# Patient Record
Sex: FEMALE | Race: BLACK OR AFRICAN AMERICAN | NOT HISPANIC OR LATINO
[De-identification: names, ages, dates, MRNs, and addresses within clinical notes are randomized per-mention and may not be internally consistent; named-entity substitution may affect disease eponyms.]

---

## 2017-02-07 ENCOUNTER — CLINICAL ADVICE (OUTPATIENT)
Age: 27
End: 2017-02-07

## 2017-04-10 ENCOUNTER — APPOINTMENT (OUTPATIENT)
Dept: OBGYN | Facility: CLINIC | Age: 27
End: 2017-04-10

## 2018-04-12 ENCOUNTER — TRANSCRIPTION ENCOUNTER (OUTPATIENT)
Age: 28
End: 2018-04-12

## 2018-04-12 ENCOUNTER — APPOINTMENT (OUTPATIENT)
Dept: OBGYN | Facility: CLINIC | Age: 28
End: 2018-04-12
Payer: COMMERCIAL

## 2018-04-12 ENCOUNTER — LABORATORY RESULT (OUTPATIENT)
Age: 28
End: 2018-04-12

## 2018-04-12 VITALS
HEIGHT: 65 IN | BODY MASS INDEX: 25.08 KG/M2 | DIASTOLIC BLOOD PRESSURE: 78 MMHG | WEIGHT: 150.56 LBS | SYSTOLIC BLOOD PRESSURE: 112 MMHG | HEART RATE: 60 BPM

## 2018-04-12 DIAGNOSIS — N92.6 IRREGULAR MENSTRUATION, UNSPECIFIED: ICD-10-CM

## 2018-04-12 DIAGNOSIS — N89.8 OTHER SPECIFIED NONINFLAMMATORY DISORDERS OF VAGINA: ICD-10-CM

## 2018-04-12 PROCEDURE — 99213 OFFICE O/P EST LOW 20 MIN: CPT | Mod: 25

## 2018-04-12 PROCEDURE — 99395 PREV VISIT EST AGE 18-39: CPT

## 2018-04-16 LAB
CANDIDA VAG CYTO: NOT DETECTED
G VAGINALIS+PREV SP MTYP VAG QL MICRO: DETECTED
HBV SURFACE AG SER QL: NONREACTIVE
HCG SERPL-MCNC: <1 MIU/ML
HCV AB SER QL: NONREACTIVE
HCV S/CO RATIO: 0.1 S/CO
HIV1+2 AB SPEC QL IA.RAPID: NONREACTIVE
T PALLIDUM AB SER QL IA: NEGATIVE
T VAGINALIS VAG QL WET PREP: NOT DETECTED

## 2018-05-07 ENCOUNTER — OTHER (OUTPATIENT)
Age: 28
End: 2018-05-07

## 2018-10-25 ENCOUNTER — RX RENEWAL (OUTPATIENT)
Age: 28
End: 2018-10-25

## 2019-06-10 ENCOUNTER — TRANSCRIPTION ENCOUNTER (OUTPATIENT)
Age: 29
End: 2019-06-10

## 2019-06-10 ENCOUNTER — MEDICATION RENEWAL (OUTPATIENT)
Age: 29
End: 2019-06-10

## 2019-06-12 ENCOUNTER — MEDICATION RENEWAL (OUTPATIENT)
Age: 29
End: 2019-06-12

## 2019-07-30 ENCOUNTER — APPOINTMENT (OUTPATIENT)
Dept: OBGYN | Facility: CLINIC | Age: 29
End: 2019-07-30
Payer: COMMERCIAL

## 2019-07-30 VITALS
DIASTOLIC BLOOD PRESSURE: 71 MMHG | HEIGHT: 65 IN | BODY MASS INDEX: 26.66 KG/M2 | HEART RATE: 57 BPM | WEIGHT: 160 LBS | SYSTOLIC BLOOD PRESSURE: 108 MMHG

## 2019-07-30 DIAGNOSIS — R63.5 ABNORMAL WEIGHT GAIN: ICD-10-CM

## 2019-07-30 DIAGNOSIS — Z01.419 ENCOUNTER FOR GYNECOLOGICAL EXAMINATION (GENERAL) (ROUTINE) W/OUT ABNORMAL FINDINGS: ICD-10-CM

## 2019-07-30 DIAGNOSIS — Z30.41 ENCOUNTER FOR SURVEILLANCE OF CONTRACEPTIVE PILLS: ICD-10-CM

## 2019-07-30 PROCEDURE — 99395 PREV VISIT EST AGE 18-39: CPT

## 2019-07-30 PROCEDURE — 99213 OFFICE O/P EST LOW 20 MIN: CPT | Mod: 25

## 2019-07-30 NOTE — PHYSICAL EXAM
[Awake] : awake [Alert] : alert [Soft] : soft [Oriented x3] : oriented to person, place, and time [Normal] : cervix [Discharge] : a  ~M vaginal discharge was present [Uterine Adnexae] : were not tender and not enlarged [No Bleeding] : there was no active vaginal bleeding [Acute Distress] : no acute distress [Mass] : no breast mass [Nipple Discharge] : no nipple discharge [Axillary LAD] : no axillary lymphadenopathy [Tender] : non tender [FreeTextEntry4] : white dc

## 2019-07-30 NOTE — COUNSELING
[Nutrition] : nutrition [Breast Self Exam] : breast self exam [Vitamins/Supplements] : vitamins/supplements [Exercise] : exercise [STD (testing, results, tx)] : STD (testing, results, tx) [Contraception] : contraception [Other ___] : [unfilled] [Safe Sexual Practices] : safe sexual practices [Weight Management] : weight management

## 2019-08-01 ENCOUNTER — OTHER (OUTPATIENT)
Age: 29
End: 2019-08-01

## 2019-08-01 ENCOUNTER — MEDICATION RENEWAL (OUTPATIENT)
Age: 29
End: 2019-08-01

## 2019-08-01 LAB
C TRACH RRNA SPEC QL NAA+PROBE: DETECTED
CANDIDA VAG CYTO: NOT DETECTED
G VAGINALIS+PREV SP MTYP VAG QL MICRO: DETECTED
HBV SURFACE AG SER QL: NONREACTIVE
HCV AB SER QL: NONREACTIVE
HCV S/CO RATIO: 0.07 S/CO
HIV1+2 AB SPEC QL IA.RAPID: NONREACTIVE
N GONORRHOEA RRNA SPEC QL NAA+PROBE: NOT DETECTED
SOURCE AMPLIFICATION: NORMAL
T PALLIDUM AB SER QL IA: NEGATIVE
T VAGINALIS VAG QL WET PREP: NOT DETECTED

## 2019-08-04 LAB — CYTOLOGY CVX/VAG DOC THIN PREP: ABNORMAL

## 2019-08-05 ENCOUNTER — OTHER (OUTPATIENT)
Age: 29
End: 2019-08-05

## 2019-10-09 ENCOUNTER — APPOINTMENT (OUTPATIENT)
Dept: OBGYN | Facility: CLINIC | Age: 29
End: 2019-10-09
Payer: COMMERCIAL

## 2019-10-09 VITALS
HEART RATE: 52 BPM | HEIGHT: 65 IN | SYSTOLIC BLOOD PRESSURE: 117 MMHG | WEIGHT: 160 LBS | DIASTOLIC BLOOD PRESSURE: 79 MMHG | BODY MASS INDEX: 26.66 KG/M2

## 2019-10-09 DIAGNOSIS — A74.9 CHLAMYDIAL INFECTION, UNSPECIFIED: ICD-10-CM

## 2019-10-09 DIAGNOSIS — N76.0 ACUTE VAGINITIS: ICD-10-CM

## 2019-10-09 DIAGNOSIS — B96.89 ACUTE VAGINITIS: ICD-10-CM

## 2019-10-09 PROCEDURE — 99213 OFFICE O/P EST LOW 20 MIN: CPT

## 2019-10-10 LAB
C TRACH RRNA SPEC QL NAA+PROBE: NOT DETECTED
CANDIDA VAG CYTO: NOT DETECTED
G VAGINALIS+PREV SP MTYP VAG QL MICRO: DETECTED
N GONORRHOEA RRNA SPEC QL NAA+PROBE: NOT DETECTED
SOURCE AMPLIFICATION: NORMAL
T VAGINALIS VAG QL WET PREP: NOT DETECTED

## 2019-10-11 ENCOUNTER — OTHER (OUTPATIENT)
Age: 29
End: 2019-10-11

## 2019-11-04 ENCOUNTER — OTHER (OUTPATIENT)
Age: 29
End: 2019-11-04

## 2019-11-04 ENCOUNTER — MEDICATION RENEWAL (OUTPATIENT)
Age: 29
End: 2019-11-04

## 2020-07-26 ENCOUNTER — TRANSCRIPTION ENCOUNTER (OUTPATIENT)
Age: 30
End: 2020-07-26

## 2020-09-21 ENCOUNTER — RX RENEWAL (OUTPATIENT)
Age: 30
End: 2020-09-21

## 2020-09-21 ENCOUNTER — TRANSCRIPTION ENCOUNTER (OUTPATIENT)
Age: 30
End: 2020-09-21

## 2020-09-24 ENCOUNTER — TRANSCRIPTION ENCOUNTER (OUTPATIENT)
Age: 30
End: 2020-09-24

## 2020-10-20 ENCOUNTER — APPOINTMENT (OUTPATIENT)
Dept: OBGYN | Facility: CLINIC | Age: 30
End: 2020-10-20
Payer: COMMERCIAL

## 2020-10-20 VITALS
BODY MASS INDEX: 26.66 KG/M2 | SYSTOLIC BLOOD PRESSURE: 117 MMHG | HEART RATE: 65 BPM | DIASTOLIC BLOOD PRESSURE: 76 MMHG | TEMPERATURE: 98.8 F | HEIGHT: 65 IN | WEIGHT: 160 LBS

## 2020-10-20 DIAGNOSIS — R31.9 HEMATURIA, UNSPECIFIED: ICD-10-CM

## 2020-10-20 DIAGNOSIS — R30.0 DYSURIA: ICD-10-CM

## 2020-10-20 DIAGNOSIS — Z11.3 ENCOUNTER FOR SCREENING FOR INFECTIONS WITH A PREDOMINANTLY SEXUAL MODE OF TRANSMISSION: ICD-10-CM

## 2020-10-20 DIAGNOSIS — R10.2 PELVIC AND PERINEAL PAIN: ICD-10-CM

## 2020-10-20 DIAGNOSIS — Z87.448 PERSONAL HISTORY OF OTHER DISEASES OF URINARY SYSTEM: ICD-10-CM

## 2020-10-20 DIAGNOSIS — Z01.411 ENCOUNTER FOR GYNECOLOGICAL EXAMINATION (GENERAL) (ROUTINE) WITH ABNORMAL FINDINGS: ICD-10-CM

## 2020-10-20 PROCEDURE — 99385 PREV VISIT NEW AGE 18-39: CPT

## 2020-10-20 PROCEDURE — 99214 OFFICE O/P EST MOD 30 MIN: CPT | Mod: 25

## 2020-10-20 RX ORDER — METRONIDAZOLE 7.5 MG/G
0.75 GEL VAGINAL
Qty: 1 | Refills: 1 | Status: COMPLETED | COMMUNITY
Start: 2019-08-01 | End: 2020-10-20

## 2020-10-20 RX ORDER — AZITHROMYCIN 1 G/1
1 POWDER, FOR SUSPENSION ORAL
Qty: 1 | Refills: 1 | Status: COMPLETED | COMMUNITY
Start: 2019-08-01 | End: 2020-10-20

## 2020-10-20 RX ORDER — NORETHINDRONE ACETATE AND ETHINYL ESTRADIOL AND FERROUS FUMARATE 1MG-20(21)
1-20 KIT ORAL
Qty: 84 | Refills: 2 | Status: COMPLETED | COMMUNITY
Start: 2019-07-30 | End: 2020-10-20

## 2020-10-20 RX ORDER — NITROFURANTOIN (MONOHYDRATE/MACROCRYSTALS) 25; 75 MG/1; MG/1
100 CAPSULE ORAL
Qty: 14 | Refills: 0 | Status: COMPLETED | COMMUNITY
Start: 2020-10-05 | End: 2020-10-20

## 2020-10-20 RX ORDER — METRONIDAZOLE 7.5 MG/G
0.75 GEL VAGINAL
Qty: 70 | Refills: 0 | Status: COMPLETED | COMMUNITY
Start: 2018-05-07 | End: 2020-10-20

## 2020-10-20 RX ORDER — FLUCONAZOLE 150 MG/1
150 TABLET ORAL
Qty: 4 | Refills: 0 | Status: COMPLETED | COMMUNITY
Start: 2019-08-01 | End: 2020-10-20

## 2020-10-20 RX ORDER — CLINDAMYCIN PHOSPHATE 100 MG/5G
2 CREAM VAGINAL DAILY
Qty: 1 | Refills: 0 | Status: COMPLETED | COMMUNITY
Start: 2019-10-11 | End: 2020-10-20

## 2020-10-20 RX ORDER — NORETHINDRONE ACETATE/ETHINYL ESTRADIOL AND FERROUS FUMARATE 1MG-20(21)
1-20 KIT ORAL
Qty: 84 | Refills: 0 | Status: COMPLETED | COMMUNITY
Start: 2020-09-21 | End: 2020-10-20

## 2020-10-20 RX ORDER — NORETHINDRONE ACETATE AND ETHINYL ESTRADIOL AND FERROUS FUMARATE 1MG-20(21)
1-20 KIT ORAL
Qty: 84 | Refills: 0 | Status: COMPLETED | COMMUNITY
Start: 2018-04-12 | End: 2020-10-20

## 2020-10-21 LAB
C TRACH RRNA SPEC QL NAA+PROBE: NOT DETECTED
CANDIDA VAG CYTO: NOT DETECTED
G VAGINALIS+PREV SP MTYP VAG QL MICRO: DETECTED
HIV1+2 AB SPEC QL IA.RAPID: NONREACTIVE
N GONORRHOEA RRNA SPEC QL NAA+PROBE: NOT DETECTED
SOURCE AMPLIFICATION: NORMAL
T PALLIDUM AB SER QL IA: NEGATIVE
T VAGINALIS VAG QL WET PREP: NOT DETECTED

## 2020-10-22 NOTE — COUNSELING
[Nutrition/ Exercise/ Weight Management] : nutrition, exercise, weight management [Body Image] : body image [Vitamins/Supplements] : vitamins/supplements [Sunscreen] : sunscreen [Drugs/Alcohol] : drugs, alcohol [Breast Self Exam] : breast self exam [Contraception/ Emergency Contraception/ Safe Sexual Practices] : contraception, emergency contraception, safe sexual practices [STD (testing, results, tx)] : STD (testing, results, tx)

## 2020-10-22 NOTE — REVIEW OF SYSTEMS
[Urgency] : urgency [Frequency] : frequency [Dysuria] : dysuria [Abn Vaginal bleeding] : abnormal vaginal bleeding [Pelvic pain] : pelvic pain [Negative] : Heme/Lymph

## 2020-10-22 NOTE — PLAN
[FreeTextEntry1] : KASSIDY is a 29 year year old female presenting for well woman exam. Sexually active, monogamous with 1 male partner. \par Using OCPsfor contraception. \par \par HCM\par pap\par STI screening\par \par Vaginitis\par Affirm sent, discussed likely BV and need for different medication other than metrogel and possible boric acid\par \par dysmenorrhea/heavy menses\par pelvic sono\par switch back to junel for now\par \par dysuria/hematuria\par urinalysis and urine culture

## 2020-10-22 NOTE — REASON FOR VISIT
[Annual] : an annual visit. [Abnormal Uterine Bleeding] : abnormal uterine bleeding [Pelvic Pain] : pelvic pain [Urinary Complaints] : urinary complaints [Vulvar/Vaginal Complaint] : vulvar/vaginal complaint

## 2020-10-22 NOTE — HISTORY OF PRESENT ILLNESS
[Patient reported PAP Smear was normal] : Patient reported PAP Smear was normal [HIV Test offered] : HIV Test offered [Syphilis test offered] : Syphilis test offered [Gonorrhea test offered] : Gonorrhea test offered [Chlamydia test offered] : Chlamydia test offered [Trichomonas test offered] : Trichomonas test offered [HPV test offered] : HPV test offered [Hepatitis B test offered] : Hepatitis B test offered [Hepatitis C test offered] : Hepatitis C test offered [Excessive Bleeding] : there was excessive bleeding [Dysmenorrhea] : dysmenorrhea [Y] : Patient is sexually active [TextBox_4] : 29 year old female here for well woman visit. patient also has complaint of vaginal discharge, thin and white with foul odor. patient has had persistent BV for many years\par also has new onset dysmenorrhea and heavy menses x the last 4 months. patient was on Junel for long time and was switched to generic version 6 months ago\par patient also has dysuria and blood in her urine [PapSmeardate] : 07/19 [LMPDate] : 10/13/20 [Diffuse] : diffuse [Menses] : menses [TextBox_10] : cramping 6/10 [TextBox_13] : dysmenorhea [Abnormal Quantity] : abnormal quantity [___ Months] : [unfilled] months [Currently Active] : currently active [Men] : men [Vaginal] : vaginal [No] : No [Patient would like to be screened for STIs] : Patient would like to be screened for STIs

## 2020-10-23 LAB
BACTERIA UR CULT: NORMAL
CYTOLOGY CVX/VAG DOC THIN PREP: ABNORMAL
HBV SURFACE AG SER QL: NONREACTIVE
HCV AB SER QL: NONREACTIVE
HCV S/CO RATIO: 0.09 S/CO

## 2020-10-27 ENCOUNTER — APPOINTMENT (OUTPATIENT)
Dept: OBGYN | Facility: CLINIC | Age: 30
End: 2020-10-27
Payer: COMMERCIAL

## 2020-10-27 ENCOUNTER — ASOB RESULT (OUTPATIENT)
Age: 30
End: 2020-10-27

## 2020-10-27 PROCEDURE — 76830 TRANSVAGINAL US NON-OB: CPT

## 2020-10-27 PROCEDURE — 99072 ADDL SUPL MATRL&STAF TM PHE: CPT

## 2020-11-11 ENCOUNTER — NON-APPOINTMENT (OUTPATIENT)
Age: 30
End: 2020-11-11

## 2020-12-01 ENCOUNTER — NON-APPOINTMENT (OUTPATIENT)
Age: 30
End: 2020-12-01

## 2020-12-01 ENCOUNTER — APPOINTMENT (OUTPATIENT)
Dept: OBGYN | Facility: CLINIC | Age: 30
End: 2020-12-01

## 2020-12-15 ENCOUNTER — APPOINTMENT (OUTPATIENT)
Dept: OBGYN | Facility: CLINIC | Age: 30
End: 2020-12-15
Payer: COMMERCIAL

## 2020-12-15 VITALS
DIASTOLIC BLOOD PRESSURE: 88 MMHG | HEIGHT: 65 IN | BODY MASS INDEX: 27.82 KG/M2 | TEMPERATURE: 98.5 F | HEART RATE: 99 BPM | SYSTOLIC BLOOD PRESSURE: 126 MMHG | WEIGHT: 167 LBS

## 2020-12-15 PROCEDURE — 99213 OFFICE O/P EST LOW 20 MIN: CPT

## 2020-12-15 PROCEDURE — 99072 ADDL SUPL MATRL&STAF TM PHE: CPT

## 2020-12-30 ENCOUNTER — TRANSCRIPTION ENCOUNTER (OUTPATIENT)
Age: 30
End: 2020-12-30

## 2021-11-04 ENCOUNTER — APPOINTMENT (OUTPATIENT)
Dept: OBGYN | Facility: CLINIC | Age: 31
End: 2021-11-04

## 2022-03-06 ENCOUNTER — APPOINTMENT (OUTPATIENT)
Dept: OBGYN | Facility: CLINIC | Age: 32
End: 2022-03-06

## 2022-03-16 ENCOUNTER — LABORATORY RESULT (OUTPATIENT)
Age: 32
End: 2022-03-16

## 2022-03-16 ENCOUNTER — APPOINTMENT (OUTPATIENT)
Dept: OBGYN | Facility: CLINIC | Age: 32
End: 2022-03-16
Payer: COMMERCIAL

## 2022-03-16 VITALS
BODY MASS INDEX: 27.66 KG/M2 | WEIGHT: 166 LBS | HEART RATE: 71 BPM | SYSTOLIC BLOOD PRESSURE: 109 MMHG | HEIGHT: 65 IN | DIASTOLIC BLOOD PRESSURE: 73 MMHG | TEMPERATURE: 97.6 F

## 2022-03-16 DIAGNOSIS — Z11.3 ENCOUNTER FOR SCREENING FOR INFECTIONS WITH A PREDOMINANTLY SEXUAL MODE OF TRANSMISSION: ICD-10-CM

## 2022-03-16 DIAGNOSIS — D21.9 BENIGN NEOPLASM OF CONNECTIVE AND OTHER SOFT TISSUE, UNSPECIFIED: ICD-10-CM

## 2022-03-16 DIAGNOSIS — Z01.419 ENCOUNTER FOR GYNECOLOGICAL EXAMINATION (GENERAL) (ROUTINE) W/OUT ABNORMAL FINDINGS: ICD-10-CM

## 2022-03-16 PROCEDURE — 99395 PREV VISIT EST AGE 18-39: CPT

## 2022-03-16 RX ORDER — NORETHINDRONE ACETATE AND ETHINYL ESTRADIOL 1; 20 MG/1; UG/1
1-20 TABLET ORAL DAILY
Qty: 3 | Refills: 0 | Status: COMPLETED | COMMUNITY
Start: 2020-10-20 | End: 2022-03-16

## 2022-03-16 RX ORDER — CLINDAMYCIN PHOSPHATE 100 MG/5G
2 CREAM VAGINAL DAILY
Qty: 1 | Refills: 0 | Status: COMPLETED | COMMUNITY
Start: 2020-10-21 | End: 2022-03-16

## 2022-03-17 LAB
C TRACH RRNA SPEC QL NAA+PROBE: NOT DETECTED
HBV SURFACE AG SER QL: NONREACTIVE
HCV AB SER QL: NONREACTIVE
HCV S/CO RATIO: 0.1 S/CO
HIV1+2 AB SPEC QL IA.RAPID: NONREACTIVE
N GONORRHOEA RRNA SPEC QL NAA+PROBE: NOT DETECTED
SOURCE AMPLIFICATION: NORMAL
T PALLIDUM AB SER QL IA: NEGATIVE

## 2022-03-18 DIAGNOSIS — N76.0 ACUTE VAGINITIS: ICD-10-CM

## 2022-03-18 DIAGNOSIS — B96.89 ACUTE VAGINITIS: ICD-10-CM

## 2022-03-18 LAB
CANDIDA VAG CYTO: NOT DETECTED
G VAGINALIS+PREV SP MTYP VAG QL MICRO: DETECTED
T VAGINALIS VAG QL WET PREP: NOT DETECTED

## 2022-03-25 LAB
CYTOLOGY CVX/VAG DOC THIN PREP: ABNORMAL
HPV HIGH+LOW RISK DNA PNL CVX: DETECTED

## 2022-03-30 ENCOUNTER — ASOB RESULT (OUTPATIENT)
Age: 32
End: 2022-03-30

## 2022-03-30 ENCOUNTER — APPOINTMENT (OUTPATIENT)
Dept: OBGYN | Facility: CLINIC | Age: 32
End: 2022-03-30
Payer: COMMERCIAL

## 2022-03-30 VITALS
HEART RATE: 60 BPM | BODY MASS INDEX: 27.82 KG/M2 | DIASTOLIC BLOOD PRESSURE: 61 MMHG | SYSTOLIC BLOOD PRESSURE: 113 MMHG | WEIGHT: 167 LBS | HEIGHT: 65 IN

## 2022-03-30 PROCEDURE — 76830 TRANSVAGINAL US NON-OB: CPT

## 2022-03-30 PROCEDURE — 99213 OFFICE O/P EST LOW 20 MIN: CPT

## 2022-08-12 PROBLEM — R31.9 HEMATURIA: Status: ACTIVE | Noted: 2022-08-12

## 2022-08-12 PROBLEM — Z87.448 HISTORY OF HEMATURIA: Status: RESOLVED | Noted: 2020-10-05 | Resolved: 2022-08-12

## 2022-11-23 ENCOUNTER — APPOINTMENT (OUTPATIENT)
Dept: OBGYN | Facility: CLINIC | Age: 32
End: 2022-11-23

## 2023-01-26 ENCOUNTER — APPOINTMENT (OUTPATIENT)
Dept: OBGYN | Facility: CLINIC | Age: 33
End: 2023-01-26
Payer: COMMERCIAL

## 2023-01-26 DIAGNOSIS — N92.6 IRREGULAR MENSTRUATION, UNSPECIFIED: ICD-10-CM

## 2023-01-26 PROCEDURE — 99213 OFFICE O/P EST LOW 20 MIN: CPT

## 2023-01-26 RX ORDER — NITROFURANTOIN MACROCRYSTALS 100 MG/1
100 CAPSULE ORAL
Qty: 14 | Refills: 0 | Status: COMPLETED | COMMUNITY
Start: 2022-08-12 | End: 2023-01-26

## 2023-01-26 RX ORDER — METRONIDAZOLE 7.5 MG/G
0.75 GEL VAGINAL
Qty: 1 | Refills: 0 | Status: COMPLETED | COMMUNITY
Start: 2022-03-18 | End: 2023-01-26

## 2023-01-27 LAB
ESTIMATED AVERAGE GLUCOSE: 94 MG/DL
ESTRADIOL SERPL-MCNC: 74 PG/ML
FSH SERPL-MCNC: 5.1 IU/L
HBA1C MFR BLD HPLC: 4.9 %
HCG SERPL-MCNC: <1 MIU/ML
INSULIN SERPL-MCNC: 9.7 UU/ML
LH SERPL-ACNC: 7.9 IU/L
PROLACTIN SERPL-MCNC: 12.1 NG/ML
T3FREE SERPL-MCNC: 3.1 PG/ML
T4 FREE SERPL-MCNC: 1.2 NG/DL
TSH SERPL-ACNC: 0.92 UIU/ML

## 2023-01-31 LAB
TESTOST FREE SERPL-MCNC: 0.8 PG/ML
TESTOST SERPL-MCNC: 14.6 NG/DL

## 2023-02-02 ENCOUNTER — APPOINTMENT (OUTPATIENT)
Dept: OBGYN | Facility: CLINIC | Age: 33
End: 2023-02-02
Payer: COMMERCIAL

## 2023-02-02 ENCOUNTER — ASOB RESULT (OUTPATIENT)
Age: 33
End: 2023-02-02

## 2023-02-02 VITALS
DIASTOLIC BLOOD PRESSURE: 82 MMHG | WEIGHT: 175 LBS | HEIGHT: 65 IN | BODY MASS INDEX: 29.16 KG/M2 | SYSTOLIC BLOOD PRESSURE: 117 MMHG | HEART RATE: 73 BPM

## 2023-02-02 PROCEDURE — 99213 OFFICE O/P EST LOW 20 MIN: CPT

## 2023-02-02 PROCEDURE — 76830 TRANSVAGINAL US NON-OB: CPT

## 2023-02-06 LAB
ANTI-MUELLERIAN HORMONE: 3.15 NG/ML
DHEA-SULFATE, SERUM: 106 UG/DL

## 2023-05-03 ENCOUNTER — APPOINTMENT (OUTPATIENT)
Dept: OBGYN | Facility: CLINIC | Age: 33
End: 2023-05-03

## 2023-10-17 ENCOUNTER — TRANSCRIPTION ENCOUNTER (OUTPATIENT)
Age: 33
End: 2023-10-17

## 2023-12-15 ENCOUNTER — APPOINTMENT (OUTPATIENT)
Dept: OBGYN | Facility: CLINIC | Age: 33
End: 2023-12-15
Payer: COMMERCIAL

## 2023-12-15 ENCOUNTER — ASOB RESULT (OUTPATIENT)
Age: 33
End: 2023-12-15

## 2023-12-15 VITALS
WEIGHT: 181 LBS | BODY MASS INDEX: 30.16 KG/M2 | HEIGHT: 65 IN | SYSTOLIC BLOOD PRESSURE: 104 MMHG | DIASTOLIC BLOOD PRESSURE: 71 MMHG | HEART RATE: 61 BPM

## 2023-12-15 DIAGNOSIS — Z78.9 OTHER SPECIFIED HEALTH STATUS: ICD-10-CM

## 2023-12-15 DIAGNOSIS — N92.6 IRREGULAR MENSTRUATION, UNSPECIFIED: ICD-10-CM

## 2023-12-15 PROCEDURE — 76817 TRANSVAGINAL US OBSTETRIC: CPT

## 2023-12-15 PROCEDURE — 99213 OFFICE O/P EST LOW 20 MIN: CPT

## 2023-12-15 RX ORDER — FLUCONAZOLE 150 MG/1
150 TABLET ORAL
Qty: 1 | Refills: 0 | Status: DISCONTINUED | COMMUNITY
Start: 2022-10-28 | End: 2023-12-15

## 2023-12-18 ENCOUNTER — APPOINTMENT (OUTPATIENT)
Dept: OBGYN | Facility: CLINIC | Age: 33
End: 2023-12-18

## 2023-12-18 DIAGNOSIS — O36.80X0 PREGNANCY WITH INCONCLUSIVE FETAL VIABILITY, NOT APPLICABLE OR UNSPECIFIED: ICD-10-CM

## 2023-12-18 LAB — HCG SERPL-MCNC: 113 MIU/ML

## 2023-12-19 LAB — HCG SERPL-MCNC: 406 MIU/ML

## 2023-12-22 ENCOUNTER — APPOINTMENT (OUTPATIENT)
Dept: OBGYN | Facility: CLINIC | Age: 33
End: 2023-12-22

## 2023-12-24 LAB — HCG SERPL-MCNC: 1258 MIU/ML

## 2023-12-26 NOTE — PLAN
[FreeTextEntry1] : missed menses today - HCG levels repeated drawn today.  - Discussed reasons for miscarriages and fibroids shown in sono. Discussed Saline sono for fibroids.  - Referral for saline sono given for after next menses.

## 2023-12-26 NOTE — DISCUSSION/SUMMARY
[FreeTextEntry1] : This note was written by Johanny Campoverde on 12/15/2023 actively solely Eleuterio Ansari M.D.  All medical record entries made by this scribe at my, Eleuterio Ansair M.D direction and personally dictated by me on 12/15/2023. I have personally reviewed the chart and agree that the record reflects my personal performance of the history, physical exam, assessment, and plan.

## 2023-12-26 NOTE — HISTORY OF PRESENT ILLNESS
[No] : Patient does not have concerns regarding sex [PapSmeardate] : 03/2022 [FreeTextEntry1] : 11/09/23

## 2023-12-27 ENCOUNTER — APPOINTMENT (OUTPATIENT)
Dept: OBGYN | Facility: CLINIC | Age: 33
End: 2023-12-27

## 2023-12-28 LAB — HCG SERPL-MCNC: 999 MIU/ML

## 2023-12-29 ENCOUNTER — APPOINTMENT (OUTPATIENT)
Dept: OBGYN | Facility: CLINIC | Age: 33
End: 2023-12-29
Payer: COMMERCIAL

## 2023-12-29 ENCOUNTER — ASOB RESULT (OUTPATIENT)
Age: 33
End: 2023-12-29

## 2023-12-29 VITALS
WEIGHT: 181 LBS | SYSTOLIC BLOOD PRESSURE: 111 MMHG | HEIGHT: 65 IN | DIASTOLIC BLOOD PRESSURE: 75 MMHG | BODY MASS INDEX: 30.16 KG/M2 | HEART RATE: 75 BPM

## 2023-12-29 LAB — HCG SERPL-MCNC: 975 MIU/ML

## 2023-12-29 PROCEDURE — 99213 OFFICE O/P EST LOW 20 MIN: CPT

## 2023-12-29 PROCEDURE — 76817 TRANSVAGINAL US OBSTETRIC: CPT

## 2024-01-01 ENCOUNTER — NON-APPOINTMENT (OUTPATIENT)
Age: 34
End: 2024-01-01

## 2024-01-02 LAB — HCG SERPL-MCNC: 1075 MIU/ML

## 2024-01-03 ENCOUNTER — EMERGENCY (EMERGENCY)
Facility: HOSPITAL | Age: 34
LOS: 1 days | Discharge: HOME | End: 2024-01-03
Admitting: EMERGENCY MEDICINE
Payer: COMMERCIAL

## 2024-01-03 VITALS
HEART RATE: 83 BPM | TEMPERATURE: 98 F | DIASTOLIC BLOOD PRESSURE: 80 MMHG | SYSTOLIC BLOOD PRESSURE: 130 MMHG | OXYGEN SATURATION: 100 % | RESPIRATION RATE: 18 BRPM

## 2024-01-03 DIAGNOSIS — Z98.89 OTHER SPECIFIED POSTPROCEDURAL STATES: Chronic | ICD-10-CM

## 2024-01-03 DIAGNOSIS — O03.9 COMPLETE OR UNSPECIFIED SPONTANEOUS ABORTION WITHOUT COMPLICATION: Chronic | ICD-10-CM

## 2024-01-03 LAB
ALBUMIN SERPL ELPH-MCNC: 4 G/DL — SIGNIFICANT CHANGE UP (ref 3.3–5)
ALBUMIN SERPL ELPH-MCNC: 4 G/DL — SIGNIFICANT CHANGE UP (ref 3.3–5)
ALP SERPL-CCNC: 80 U/L — SIGNIFICANT CHANGE UP (ref 40–120)
ALP SERPL-CCNC: 80 U/L — SIGNIFICANT CHANGE UP (ref 40–120)
ALT FLD-CCNC: 12 U/L — SIGNIFICANT CHANGE UP (ref 4–33)
ALT FLD-CCNC: 12 U/L — SIGNIFICANT CHANGE UP (ref 4–33)
ANION GAP SERPL CALC-SCNC: 9 MMOL/L — SIGNIFICANT CHANGE UP (ref 7–14)
ANION GAP SERPL CALC-SCNC: 9 MMOL/L — SIGNIFICANT CHANGE UP (ref 7–14)
APTT BLD: 30.3 SEC — SIGNIFICANT CHANGE UP (ref 24.5–35.6)
APTT BLD: 30.3 SEC — SIGNIFICANT CHANGE UP (ref 24.5–35.6)
AST SERPL-CCNC: 13 U/L — SIGNIFICANT CHANGE UP (ref 4–32)
AST SERPL-CCNC: 13 U/L — SIGNIFICANT CHANGE UP (ref 4–32)
BASOPHILS # BLD AUTO: 0.04 K/UL — SIGNIFICANT CHANGE UP (ref 0–0.2)
BASOPHILS # BLD AUTO: 0.04 K/UL — SIGNIFICANT CHANGE UP (ref 0–0.2)
BASOPHILS NFR BLD AUTO: 0.6 % — SIGNIFICANT CHANGE UP (ref 0–2)
BASOPHILS NFR BLD AUTO: 0.6 % — SIGNIFICANT CHANGE UP (ref 0–2)
BILIRUB SERPL-MCNC: 0.7 MG/DL — SIGNIFICANT CHANGE UP (ref 0.2–1.2)
BILIRUB SERPL-MCNC: 0.7 MG/DL — SIGNIFICANT CHANGE UP (ref 0.2–1.2)
BLD GP AB SCN SERPL QL: NEGATIVE — SIGNIFICANT CHANGE UP
BLD GP AB SCN SERPL QL: NEGATIVE — SIGNIFICANT CHANGE UP
BUN SERPL-MCNC: 8 MG/DL — SIGNIFICANT CHANGE UP (ref 7–23)
BUN SERPL-MCNC: 8 MG/DL — SIGNIFICANT CHANGE UP (ref 7–23)
CALCIUM SERPL-MCNC: 9 MG/DL — SIGNIFICANT CHANGE UP (ref 8.4–10.5)
CALCIUM SERPL-MCNC: 9 MG/DL — SIGNIFICANT CHANGE UP (ref 8.4–10.5)
CHLORIDE SERPL-SCNC: 101 MMOL/L — SIGNIFICANT CHANGE UP (ref 98–107)
CHLORIDE SERPL-SCNC: 101 MMOL/L — SIGNIFICANT CHANGE UP (ref 98–107)
CO2 SERPL-SCNC: 26 MMOL/L — SIGNIFICANT CHANGE UP (ref 22–31)
CO2 SERPL-SCNC: 26 MMOL/L — SIGNIFICANT CHANGE UP (ref 22–31)
CREAT SERPL-MCNC: 0.69 MG/DL — SIGNIFICANT CHANGE UP (ref 0.5–1.3)
CREAT SERPL-MCNC: 0.69 MG/DL — SIGNIFICANT CHANGE UP (ref 0.5–1.3)
EGFR: 118 ML/MIN/1.73M2 — SIGNIFICANT CHANGE UP
EGFR: 118 ML/MIN/1.73M2 — SIGNIFICANT CHANGE UP
EOSINOPHIL # BLD AUTO: 0.15 K/UL — SIGNIFICANT CHANGE UP (ref 0–0.5)
EOSINOPHIL # BLD AUTO: 0.15 K/UL — SIGNIFICANT CHANGE UP (ref 0–0.5)
EOSINOPHIL NFR BLD AUTO: 2.4 % — SIGNIFICANT CHANGE UP (ref 0–6)
EOSINOPHIL NFR BLD AUTO: 2.4 % — SIGNIFICANT CHANGE UP (ref 0–6)
GLUCOSE SERPL-MCNC: 116 MG/DL — HIGH (ref 70–99)
GLUCOSE SERPL-MCNC: 116 MG/DL — HIGH (ref 70–99)
HCG SERPL-ACNC: 1274 MIU/ML — SIGNIFICANT CHANGE UP
HCG SERPL-ACNC: 1274 MIU/ML — SIGNIFICANT CHANGE UP
HCT VFR BLD CALC: 34.2 % — LOW (ref 34.5–45)
HCT VFR BLD CALC: 34.2 % — LOW (ref 34.5–45)
HGB BLD-MCNC: 11.3 G/DL — LOW (ref 11.5–15.5)
HGB BLD-MCNC: 11.3 G/DL — LOW (ref 11.5–15.5)
IANC: 2.92 K/UL — SIGNIFICANT CHANGE UP (ref 1.8–7.4)
IANC: 2.92 K/UL — SIGNIFICANT CHANGE UP (ref 1.8–7.4)
IMM GRANULOCYTES NFR BLD AUTO: 0.2 % — SIGNIFICANT CHANGE UP (ref 0–0.9)
IMM GRANULOCYTES NFR BLD AUTO: 0.2 % — SIGNIFICANT CHANGE UP (ref 0–0.9)
INR BLD: 1.02 RATIO — SIGNIFICANT CHANGE UP (ref 0.85–1.18)
INR BLD: 1.02 RATIO — SIGNIFICANT CHANGE UP (ref 0.85–1.18)
LYMPHOCYTES # BLD AUTO: 2.74 K/UL — SIGNIFICANT CHANGE UP (ref 1–3.3)
LYMPHOCYTES # BLD AUTO: 2.74 K/UL — SIGNIFICANT CHANGE UP (ref 1–3.3)
LYMPHOCYTES # BLD AUTO: 44.3 % — HIGH (ref 13–44)
LYMPHOCYTES # BLD AUTO: 44.3 % — HIGH (ref 13–44)
MCHC RBC-ENTMCNC: 29.4 PG — SIGNIFICANT CHANGE UP (ref 27–34)
MCHC RBC-ENTMCNC: 29.4 PG — SIGNIFICANT CHANGE UP (ref 27–34)
MCHC RBC-ENTMCNC: 33 GM/DL — SIGNIFICANT CHANGE UP (ref 32–36)
MCHC RBC-ENTMCNC: 33 GM/DL — SIGNIFICANT CHANGE UP (ref 32–36)
MCV RBC AUTO: 89.1 FL — SIGNIFICANT CHANGE UP (ref 80–100)
MCV RBC AUTO: 89.1 FL — SIGNIFICANT CHANGE UP (ref 80–100)
MONOCYTES # BLD AUTO: 0.33 K/UL — SIGNIFICANT CHANGE UP (ref 0–0.9)
MONOCYTES # BLD AUTO: 0.33 K/UL — SIGNIFICANT CHANGE UP (ref 0–0.9)
MONOCYTES NFR BLD AUTO: 5.3 % — SIGNIFICANT CHANGE UP (ref 2–14)
MONOCYTES NFR BLD AUTO: 5.3 % — SIGNIFICANT CHANGE UP (ref 2–14)
NEUTROPHILS # BLD AUTO: 2.92 K/UL — SIGNIFICANT CHANGE UP (ref 1.8–7.4)
NEUTROPHILS # BLD AUTO: 2.92 K/UL — SIGNIFICANT CHANGE UP (ref 1.8–7.4)
NEUTROPHILS NFR BLD AUTO: 47.2 % — SIGNIFICANT CHANGE UP (ref 43–77)
NEUTROPHILS NFR BLD AUTO: 47.2 % — SIGNIFICANT CHANGE UP (ref 43–77)
NRBC # BLD: 0 /100 WBCS — SIGNIFICANT CHANGE UP (ref 0–0)
NRBC # BLD: 0 /100 WBCS — SIGNIFICANT CHANGE UP (ref 0–0)
NRBC # FLD: 0 K/UL — SIGNIFICANT CHANGE UP (ref 0–0)
NRBC # FLD: 0 K/UL — SIGNIFICANT CHANGE UP (ref 0–0)
PLATELET # BLD AUTO: 302 K/UL — SIGNIFICANT CHANGE UP (ref 150–400)
PLATELET # BLD AUTO: 302 K/UL — SIGNIFICANT CHANGE UP (ref 150–400)
POTASSIUM SERPL-MCNC: 3.4 MMOL/L — LOW (ref 3.5–5.3)
POTASSIUM SERPL-MCNC: 3.4 MMOL/L — LOW (ref 3.5–5.3)
POTASSIUM SERPL-SCNC: 3.4 MMOL/L — LOW (ref 3.5–5.3)
POTASSIUM SERPL-SCNC: 3.4 MMOL/L — LOW (ref 3.5–5.3)
PROT SERPL-MCNC: 7.4 G/DL — SIGNIFICANT CHANGE UP (ref 6–8.3)
PROT SERPL-MCNC: 7.4 G/DL — SIGNIFICANT CHANGE UP (ref 6–8.3)
PROTHROM AB SERPL-ACNC: 11.5 SEC — SIGNIFICANT CHANGE UP (ref 9.5–13)
PROTHROM AB SERPL-ACNC: 11.5 SEC — SIGNIFICANT CHANGE UP (ref 9.5–13)
RBC # BLD: 3.84 M/UL — SIGNIFICANT CHANGE UP (ref 3.8–5.2)
RBC # BLD: 3.84 M/UL — SIGNIFICANT CHANGE UP (ref 3.8–5.2)
RBC # FLD: 11.1 % — SIGNIFICANT CHANGE UP (ref 10.3–14.5)
RBC # FLD: 11.1 % — SIGNIFICANT CHANGE UP (ref 10.3–14.5)
RH IG SCN BLD-IMP: POSITIVE — SIGNIFICANT CHANGE UP
RH IG SCN BLD-IMP: POSITIVE — SIGNIFICANT CHANGE UP
SODIUM SERPL-SCNC: 136 MMOL/L — SIGNIFICANT CHANGE UP (ref 135–145)
SODIUM SERPL-SCNC: 136 MMOL/L — SIGNIFICANT CHANGE UP (ref 135–145)
WBC # BLD: 6.19 K/UL — SIGNIFICANT CHANGE UP (ref 3.8–10.5)
WBC # BLD: 6.19 K/UL — SIGNIFICANT CHANGE UP (ref 3.8–10.5)
WBC # FLD AUTO: 6.19 K/UL — SIGNIFICANT CHANGE UP (ref 3.8–10.5)
WBC # FLD AUTO: 6.19 K/UL — SIGNIFICANT CHANGE UP (ref 3.8–10.5)

## 2024-01-03 PROCEDURE — 99285 EMERGENCY DEPT VISIT HI MDM: CPT

## 2024-01-03 PROCEDURE — 99283 EMERGENCY DEPT VISIT LOW MDM: CPT | Mod: GC

## 2024-01-03 PROCEDURE — 76830 TRANSVAGINAL US NON-OB: CPT | Mod: 26

## 2024-01-03 RX ORDER — METHOTREXATE 2.5 MG/1
100 TABLET ORAL ONCE
Refills: 0 | Status: COMPLETED | OUTPATIENT
Start: 2024-01-03 | End: 2024-01-03

## 2024-01-03 NOTE — CONSULT NOTE ADULT - SUBJECTIVE AND OBJECTIVE BOX
GYN Consult Note    KASSIDY CARRILLO  33y  Female [id number]    HPI:  33y  at 7w6d by LMP 23 who was sent in from her gynecologist's office for management of suspected ectopic pregnancy. Pt is unsure of her exact LMP. Pt states that she bled like a period again in December starting . She currently is no longer bleeding. She was being followed outpatient for pregnancy of unknown location, and her bHCG had plateaued. [facility] shows bHCG trend: 113 (12/15), 406 (), 1258 (), 999 (), 975 (), 1075 (). Pt endorses intermittent RLQ abdominal pain that happens around 3 times a day and lasts for a few minutes. She describes the pain as stabbing and sharp, 4/10. She does not currently have the pain. Pt endorses some constipation as well. She denies fever, chills, chest pain, SOB, dysuria, hematuria, urinary frequency, diarrhea.      Name of GYN Physician: Loreta  OBHx:  , TOP s/p D&C x1  GynHx: h/o firboids, chlamydia s/p treatment. Denies cysts, endometriosis, Abnormal pap smears   PMH: childhood asthma  PSH: D&Cx1  Meds: vitamin D  Allx: NKDA  Social History: Pt endorses feeling safe at home. Denies smoking use, drug use, alcohol use.    Vital Signs Last 24 Hrs  T(C): 36.8 (2024 23:05), Max: 37.6 (2024 18:25)  T(F): 98.3 (2024 23:05), Max: 99.6 (2024 18:25)  HR: 63 (2024 23:05) (63 - 83)  BP: 127/87 (2024 23:05) (100/73 - 130/80)  BP(mean): --  RR: 19 (2024 23:05) (18 - 19)  SpO2: 100% (2024 23:05) (100% - 100%)    Parameters below as of 2024 23:05  Patient On (Oxygen Delivery Method): room air        Physical Exam:   General: sitting comfortably in bed, NAD   CV: well perfused  Lungs: breathing comfortably on RA  Abd: Soft, non-tender, non-distended.  No rebound or guarding  :  No bleeding on pad.    External labia wnl.  Bimanual exam with no cervical motion tenderness, uterus wnl, adnexa non palpable b/l. Pt endorses minor b/l adnexal tenderness.  Speculum Exam: deferred  Ext: non-tender b/l, no edema     LABS:    Blood type: B+                        11.3   6.19  )-----------( 302      ( 2024 20:18 )             34.2         136  |  101  |  8   ----------------------------<  116<H>  3.4<L>   |  26  |  0.69    Ca    9.0      2024 20:18    TPro  7.4  /  Alb  4.0  /  TBili  0.7  /  DBili  x   /  AST  13  /  ALT  12  /  AlkPhos  80        PT/INR - ( 2024 20:18 )   PT: 11.5 sec;   INR: 1.02 ratio         PTT - ( 2024 20:18 )  PTT:30.3 sec  Urinalysis Basic - ( 2024 20:18 )    Color: x / Appearance: x / SG: x / pH: x  Gluc: 116 mg/dL / Ketone: x  / Bili: x / Urobili: x   Blood: x / Protein: x / Nitrite: x   Leuk Esterase: x / RBC: x / WBC x   Sq Epi: x / Non Sq Epi: x / Bacteria: x        RADIOLOGY & ADDITIONAL STUDIES:  < from: US Transvaginal (24 @ 21:09) >  ACC: [id number] EXAM:  US TRANSVAGINAL   ORDERED BY: DAVE ELIZONDO     PROCEDURE DATE:  2024          INTERPRETATION:  CLINICAL INFORMATION: Pregnant with vaginal spotting.    LMP: 2023. Estimated gestational age is 7 weeks and 6 days.    COMPARISON: None available.    TECHNIQUE:  Endovaginal pelvic sonogram only. Color and Spectral Doppler was   performed.    FINDINGS:  Uterus: 10.5 cm x 5.5 cm x 7.1 cm. multiple large leiomyomata. The cervix   is long and closed. No intrauterine pregnancy identified.  Endometrium: 4 mm. Within normal limits.    Right ovary: 3.5 cm x 2.7 cm x 3.2 cm. 2.3 cm simple cyst in the ovary.   Normal arterial and venous waveforms.  1.5 x 1.4 x 1 cm echogenic right adnexal mass seen on image 1.4 1-4 1, it   is separate from the ovary. Trace adjacent fluid.  Left ovary: 3.9 cm x 1.3 cm x 3.6 cm. 1.5 cm corpus luteum. Normal   arterial and venous waveforms.    Fluid: None.    IMPRESSION:  1.5 cm echogenic right adnexal mass, could represent an ectopic pregnancy   with internal hemorrhage. No significant free fluid to suggest rupture.    Findings were discussed with Dave BURT 1/3/2024 9:39 PM by Dr. San with read back confirmation.    --- End of Report ---          POOJA SAN MD; Resident Radiologist  This document has been electronically signed.  ALLA SALVADOR MD; Attending Radiologist  This document has been electronically signed. Jhony  3 2024 10:21PM    < end of copied text >     GYN Consult Note    KASSIDY CARRILLO  33y  Female [id number]    HPI:  33y  at 7w6d by LMP 23 who was sent in from her gynecologist's office for management of suspected ectopic pregnancy. Pt is unsure of her exact LMP. Pt states that she bled like a period again in December starting . She currently is no longer bleeding. She was being followed outpatient for pregnancy of unknown location, and her bHCG had plateaued. [facility] shows bHCG trend: 113 (12/15), 406 (), 1258 (), 999 (), 975 (), 1075 (). Pt endorses intermittent RLQ abdominal pain that happens around 3 times a day and lasts for a few minutes. She describes the pain as stabbing and sharp, 4/10. She does not currently have the pain. Pt endorses some constipation as well. She denies fever, chills, chest pain, SOB, dysuria, hematuria, urinary frequency, diarrhea.      Name of GYN Physician: Loreta  OBHx:  , TOP s/p D&C x1  GynHx: h/o firboids, chlamydia s/p treatment. Denies cysts, endometriosis, Abnormal pap smears   PMH: childhood asthma  PSH: D&Cx1  Meds: vitamin D  Allx: NKDA  Social History: Pt endorses feeling safe at home. Denies smoking use, drug use, alcohol use.    Vital Signs Last 24 Hrs  T(C): 36.8 (2024 23:05), Max: 37.6 (2024 18:25)  T(F): 98.3 (2024 23:05), Max: 99.6 (2024 18:25)  HR: 63 (2024 23:05) (63 - 83)  BP: 127/87 (2024 23:05) (100/73 - 130/80)  BP(mean): --  RR: 19 (2024 23:05) (18 - 19)  SpO2: 100% (2024 23:05) (100% - 100%)    Parameters below as of 2024 23:05  Patient On (Oxygen Delivery Method): room air        Physical Exam:   General: sitting comfortably in bed, NAD   CV: well perfused  Lungs: breathing comfortably on RA  Abd: Soft, non-tender, non-distended.  No rebound or guarding  :  No bleeding on pad.    External labia wnl.  Bimanual exam with no cervical motion tenderness, uterus wnl, adnexa non palpable b/l. Pt endorses minor b/l adnexal tenderness.  Speculum Exam: deferred  Ext: non-tender b/l, no edema     LABS:    Blood type: B+                        11.3   6.19  )-----------( 302      ( 2024 20:18 )             34.2         136  |  101  |  8   ----------------------------<  116<H>  3.4<L>   |  26  |  0.69    Ca    9.0      2024 20:18    TPro  7.4  /  Alb  4.0  /  TBili  0.7  /  DBili  x   /  AST  13  /  ALT  12  /  AlkPhos  80        PT/INR - ( 2024 20:18 )   PT: 11.5 sec;   INR: 1.02 ratio         PTT - ( 2024 20:18 )  PTT:30.3 sec  Urinalysis Basic - ( 2024 20:18 )    Color: x / Appearance: x / SG: x / pH: x  Gluc: 116 mg/dL / Ketone: x  / Bili: x / Urobili: x   Blood: x / Protein: x / Nitrite: x   Leuk Esterase: x / RBC: x / WBC x   Sq Epi: x / Non Sq Epi: x / Bacteria: x        RADIOLOGY & ADDITIONAL STUDIES:  < from: US Transvaginal (24 @ 21:09) >  ACC: [id number] EXAM:  US TRANSVAGINAL   ORDERED BY: DAVE ELIZONDO     PROCEDURE DATE:  2024          INTERPRETATION:  CLINICAL INFORMATION: Pregnant with vaginal spotting.    LMP: 2023. Estimated gestational age is 7 weeks and 6 days.    COMPARISON: None available.    TECHNIQUE:  Endovaginal pelvic sonogram only. Color and Spectral Doppler was   performed.    FINDINGS:  Uterus: 10.5 cm x 5.5 cm x 7.1 cm. multiple large leiomyomata. The cervix   is long and closed. No intrauterine pregnancy identified.  Endometrium: 4 mm. Within normal limits.    Right ovary: 3.5 cm x 2.7 cm x 3.2 cm. 2.3 cm simple cyst in the ovary.   Normal arterial and venous waveforms.  1.5 x 1.4 x 1 cm echogenic right adnexal mass seen on image 1.4 1-4 1, it   is separate from the ovary. Trace adjacent fluid.  Left ovary: 3.9 cm x 1.3 cm x 3.6 cm. 1.5 cm corpus luteum. Normal   arterial and venous waveforms.    Fluid: None.    IMPRESSION:  1.5 cm echogenic right adnexal mass, could represent an ectopic pregnancy   with internal hemorrhage. No significant free fluid to suggest rupture.    Findings were discussed with Dave BURT 1/3/2024 9:39 PM by Dr. San with read back confirmation.    --- End of Report ---          POOJA SAN MD; Resident Radiologist  This document has been electronically signed.  ALLA SALVADOR MD; Attending Radiologist  This document has been electronically signed. Jhony  3 2024 10:21PM    < end of copied text >     GYN Consult Note    KASSIDY CARRILLO  33y  Female [id number]    HPI:  33y  at 7w6d by LMP 23 who was sent in from her gynecologist's office for management of suspected ectopic pregnancy. Pt is unsure of her exact LMP. Pt states that she bled like a period again in December starting . She currently is no longer bleeding. She was being followed outpatient for pregnancy of unknown location, and her bHCG had plateaued. [facility] shows bHCG trend: 113 (12/15), 406 (), 1258 (), 999 (), 975 (), 1075 (). Pt endorses intermittent RLQ abdominal pain that happens around 3 times a day and lasts for a few minutes. She describes the pain as stabbing and sharp, 4/10. She does not currently have the pain. Pt endorses some constipation as well. She denies fever, chills, chest pain, SOB, dysuria, hematuria, urinary frequency, diarrhea.      Name of GYN Physician: Loreta  OBHx:  , TOP s/p D&C x1  GynHx: h/o firboids, chlamydia s/p treatment. Denies cysts, endometriosis, Abnormal pap smears   PMH: childhood asthma  PSH: D&Cx1  Meds: vitamin D  Allx: NKDA  Social History: Pt endorses feeling safe at home. Denies smoking use, drug use, alcohol use.    Vital Signs Last 24 Hrs  T(C): 36.8 (2024 23:05), Max: 37.6 (2024 18:25)  T(F): 98.3 (2024 23:05), Max: 99.6 (2024 18:25)  HR: 63 (2024 23:05) (63 - 83)  BP: 127/87 (2024 23:05) (100/73 - 130/80)  BP(mean): --  RR: 19 (2024 23:05) (18 - 19)  SpO2: 100% (2024 23:05) (100% - 100%)    Parameters below as of 2024 23:05  Patient On (Oxygen Delivery Method): room air        Physical Exam:   General: sitting comfortably in bed, NAD   CV: well perfused  Lungs: breathing comfortably on RA  Abd: Soft, non-tender, non-distended.  No rebound or guarding  :  No bleeding on pad.    External labia wnl.  Bimanual exam with no cervical motion tenderness, uterus wnl, adnexa non palpable b/l. Pt endorses minor b/l adnexal tenderness.  Speculum Exam: deferred  Ext: non-tender b/l, no edema     LABS:    [facility]    Blood type: B+                        11.3   6.19  )-----------( 302      ( 2024 20:18 )             34.2         136  |  101  |  8   ----------------------------<  116<H>  3.4<L>   |  26  |  0.69    Ca    9.0      2024 20:18    TPro  7.4  /  Alb  4.0  /  TBili  0.7  /  DBili  x   /  AST  13  /  ALT  12  /  AlkPhos  80        PT/INR - ( 2024 20:18 )   PT: 11.5 sec;   INR: 1.02 ratio         PTT - ( 2024 20:18 )  PTT:30.3 sec  Urinalysis Basic - ( 2024 20:18 )    Color: x / Appearance: x / SG: x / pH: x  Gluc: 116 mg/dL / Ketone: x  / Bili: x / Urobili: x   Blood: x / Protein: x / Nitrite: x   Leuk Esterase: x / RBC: x / WBC x   Sq Epi: x / Non Sq Epi: x / Bacteria: x        RADIOLOGY & ADDITIONAL STUDIES:  < from: US Transvaginal (24 @ 21:09) >  ACC: [id number] EXAM:  US TRANSVAGINAL   ORDERED BY: DAVE ELIZONDO     PROCEDURE DATE:  2024          INTERPRETATION:  CLINICAL INFORMATION: Pregnant with vaginal spotting.    LMP: 2023. Estimated gestational age is 7 weeks and 6 days.    COMPARISON: None available.    TECHNIQUE:  Endovaginal pelvic sonogram only. Color and Spectral Doppler was   performed.    FINDINGS:  Uterus: 10.5 cm x 5.5 cm x 7.1 cm. multiple large leiomyomata. The cervix   is long and closed. No intrauterine pregnancy identified.  Endometrium: 4 mm. Within normal limits.    Right ovary: 3.5 cm x 2.7 cm x 3.2 cm. 2.3 cm simple cyst in the ovary.   Normal arterial and venous waveforms.  1.5 x 1.4 x 1 cm echogenic right adnexal mass seen on image 1.4 1-4 1, it   is separate from the ovary. Trace adjacent fluid.  Left ovary: 3.9 cm x 1.3 cm x 3.6 cm. 1.5 cm corpus luteum. Normal   arterial and venous waveforms.    Fluid: None.    IMPRESSION:  1.5 cm echogenic right adnexal mass, could represent an ectopic pregnancy   with internal hemorrhage. No significant free fluid to suggest rupture.    Findings were discussed with Dave BURT 1/3/2024 9:39 PM by Dr. San with read back confirmation.    --- End of Report ---          POOJA SAN MD; Resident Radiologist  This document has been electronically signed.  ALLA SALVADOR MD; Attending Radiologist  This document has been electronically signed. Jhony  3 2024 10:21PM    < end of copied text >     GYN Consult Note    KASSIDY CARRILLO  33y  Female [id number]    HPI:  33y  at 7w6d by LMP 23 who was sent in from her gynecologist's office for management of suspected ectopic pregnancy. Pt is unsure of her exact LMP. Pt states that she bled like a period again in December starting . She currently is no longer bleeding. She was being followed outpatient for pregnancy of unknown location, and her bHCG had plateaued. [facility] shows bHCG trend: 113 (12/15), 406 (), 1258 (), 999 (), 975 (), 1075 (). Pt endorses intermittent RLQ abdominal pain that happens around 3 times a day and lasts for a few minutes. She describes the pain as stabbing and sharp, 4/10. She does not currently have the pain. Pt endorses some constipation as well. She denies fever, chills, chest pain, SOB, dysuria, hematuria, urinary frequency, diarrhea.      Name of GYN Physician: Loreta  OBHx:  , TOP s/p D&C x1  GynHx: h/o firboids, chlamydia s/p treatment. Denies cysts, endometriosis, Abnormal pap smears   PMH: childhood asthma  PSH: D&Cx1  Meds: vitamin D  Allx: NKDA  Social History: Pt endorses feeling safe at home. Denies smoking use, drug use, alcohol use.    Vital Signs Last 24 Hrs  T(C): 36.8 (2024 23:05), Max: 37.6 (2024 18:25)  T(F): 98.3 (2024 23:05), Max: 99.6 (2024 18:25)  HR: 63 (2024 23:05) (63 - 83)  BP: 127/87 (2024 23:05) (100/73 - 130/80)  BP(mean): --  RR: 19 (2024 23:05) (18 - 19)  SpO2: 100% (2024 23:05) (100% - 100%)    Parameters below as of 2024 23:05  Patient On (Oxygen Delivery Method): room air        Physical Exam:   General: sitting comfortably in bed, NAD   CV: well perfused  Lungs: breathing comfortably on RA  Abd: Soft, non-tender, non-distended.  No rebound or guarding  :  No bleeding on pad.    External labia wnl.  Bimanual exam with no cervical motion tenderness, uterus wnl, adnexa non palpable b/l. Pt endorses minor b/l adnexal tenderness.  Speculum Exam: deferred  Ext: non-tender b/l, no edema     [place]S:    [facility]    Blood type: B+                        11.3   6.19  )-----------( 302      ( 2024 20:18 )             34.2         136  |  101  |  8   ----------------------------<  116<H>  3.4<L>   |  26  |  0.69    Ca    9.0      2024 20:18    TPro  7.4  /  Alb  4.0  /  TBili  0.7  /  DBili  x   /  AST  13  /  ALT  12  /  AlkPhos  80        PT/INR - ( 2024 20:18 )   PT: 11.5 sec;   INR: 1.02 ratio         PTT - ( 2024 20:18 )  PTT:30.3 sec  Urinalysis Basic - ( 2024 20:18 )    Color: x / Appearance: x / SG: x / pH: x  Gluc: 116 mg/dL / Ketone: x  / Bili: x / Urobili: x   Blood: x / Protein: x / Nitrite: x   Leuk Esterase: x / RBC: x / WBC x   Sq Epi: x / Non Sq Epi: x / Bacteria: x        RADIOLOGY & ADDITIONAL STUDIES:  < from: US Transvaginal (24 @ 21:09) >  ACC: [id number] EXAM:  US TRANSVAGINAL   ORDERED BY: DAVE ELIZONDO     PROCEDURE DATE:  2024          INTERPRETATION:  CLINICAL INFORMATION: Pregnant with vaginal spotting.    LMP: 2023. Estimated gestational age is 7 weeks and 6 days.    COMPARISON: None available.    TECHNIQUE:  Endovaginal pelvic sonogram only. Color and Spectral Doppler was   performed.    FINDINGS:  Uterus: 10.5 cm x 5.5 cm x 7.1 cm. multiple large leiomyomata. The cervix   is long and closed. No intrauterine pregnancy identified.  Endometrium: 4 mm. Within normal limits.    Right ovary: 3.5 cm x 2.7 cm x 3.2 cm. 2.3 cm simple cyst in the ovary.   Normal arterial and venous waveforms.  1.5 x 1.4 x 1 cm echogenic right adnexal mass seen on image 1.4 1-4 1, it   is separate from the ovary. Trace adjacent fluid.  Left ovary: 3.9 cm x 1.3 cm x 3.6 cm. 1.5 cm corpus luteum. Normal   arterial and venous waveforms.    Fluid: None.    IMPRESSION:  1.5 cm echogenic right adnexal mass, could represent an ectopic pregnancy   with internal hemorrhage. No significant free fluid to suggest rupture.    Findings were discussed with Dave BURT 1/3/2024 9:39 PM by Dr. San with read back confirmation.    --- End of Report ---          POOJA SAN MD; Resident Radiologist  This document has been electronically signed.  ALLA SALVADOR MD; Attending Radiologist  This document has been electronically signed. Jhony  3 2024 10:21PM    < end of copied text >

## 2024-01-03 NOTE — CONSULT NOTE ADULT - ASSESSMENT
33y  at 7w6d by LMP 23 who was sent in from her gynecologist's office for management of suspected ectopic pregnancy. Pt with benign abdominal and pelvic exam. Imaging concerning for ectopic pregnancy, not ruptured. Pt clinically and hemodynamically stable.     -Would recommend methotrexate therapy for treatment of ectopic pregnancy.    -Patient counseled on methotrexate therapy as treatment for ectopic pregnancy.  Common side effects of the medication as well as restrictions while on the medication were reviewed with patient and patient signed methotrexate information sheet that was placed in her chart.    -Reviewed with patient the 15-20% methotrexate failure rate and possibility of necessity for additional dose of methotrexate.   -Consents for methotrexate signed with patient with attending at bedside.    -Methotrexate drug order sent to pharmacy with methotrexate dose calculated based on BSA for patient.   -Patient given strict precautions to call her physician or return to ED if she experiences any severe abdominal pain, dizziness, lightheadedness, severe n/v, or any heavy vaginal bleeding >2 pads/hour for >2 hours.    -Patient instructed on need for follow up of b-hcg on day 4 and day 7 of methotrexate therapy.  Patient intends to follow up at a [facility] lab on  and in the private GYN office 1/10.  -Once patient receives methotrexate therapy she is cleared for discharge from OBGYN perspective.  Primary managment per ED team.     Pt seen and discussed with Dr. John Cordon PGY2 33y  at 7w6d by LMP 23 who was sent in from her gynecologist's office for management of suspected ectopic pregnancy. Pt with benign abdominal and pelvic exam. Imaging concerning for ectopic pregnancy, not ruptured. Pt clinically and hemodynamically stable.     -Would recommend methotrexate therapy for treatment of ectopic pregnancy.    -Patient counseled on methotrexate therapy as treatment for ectopic pregnancy.  Common side effects of the medication as well as restrictions while on the medication were reviewed with patient and patient signed methotrexate information sheet that was placed in her chart.    -Reviewed with patient the 15-20% methotrexate failure rate and possibility of necessity for additional dose of methotrexate.   -Consents for methotrexate signed with patient with attending at bedside.    -Methotrexate drug order sent to pharmacy with methotrexate dose calculated based on BSA for patient.   -Patient given strict precautions to call her physician or return to ED if she experiences any severe abdominal pain, dizziness, lightheadedness, severe n/v, or any heavy vaginal bleeding >2 pads/hour for >2 hours.    -Patient instructed on need for follow up of b-hcg on day 4 and day 7 of methotrexate therapy.  Patient intends to follow up at a [facility] lab on  and in the private GYN office 1/10.  -Once patient receives methotrexate therapy she is cleared for discharge from OBGYN perspective.  Primary managment per ED team.     Pt seen and discussed with Dr. John Cordon PGY2 33y  at 7w6d by LMP 23 who was sent in from her gynecologist's office for management of suspected ectopic pregnancy. Pt with benign abdominal and pelvic exam. Plateaued bHCG and imaging concerning for ectopic pregnancy, not ruptured. Pt clinically and hemodynamically stable.     -Would recommend methotrexate therapy for treatment of ectopic pregnancy.    -Patient counseled on methotrexate therapy as treatment for ectopic pregnancy.  Common side effects of the medication as well as restrictions while on the medication were reviewed with patient and patient signed methotrexate information sheet that was placed in her chart.    -Reviewed with patient the 15-20% methotrexate failure rate and possibility of necessity for additional dose of methotrexate.   -Consents for methotrexate signed with patient with attending at bedside.    -Methotrexate drug order sent to pharmacy with methotrexate dose calculated based on BSA for patient.   -Patient given strict precautions to call her physician or return to ED if she experiences any severe abdominal pain, dizziness, lightheadedness, severe n/v, or any heavy vaginal bleeding >2 pads/hour for >2 hours.    -Patient instructed on need for follow up of b-hcg on day 4 and day 7 of methotrexate therapy.  Patient intends to follow up at a [facility] lab on  and in the private GYN office 1/10.  -Once patient receives methotrexate therapy she is cleared for discharge from OBGYN perspective.  Primary managment per ED team.     Pt seen and discussed with Dr. John Cordon PGY2 33y  at 7w6d by LMP 23 who was sent in from her gynecologist's office for management of suspected ectopic pregnancy. Pt with benign abdominal and pelvic exam. Plateaued bHCG and imaging concerning for ectopic pregnancy, not ruptured. Pt clinically and hemodynamically stable.     -Would recommend methotrexate therapy for treatment of ectopic pregnancy.    -Patient counseled on methotrexate therapy as treatment for ectopic pregnancy.  Common side effects of the medication as well as restrictions while on the medication were reviewed with patient and patient signed methotrexate information sheet that was placed in her chart.    -Reviewed with patient the 15-20% methotrexate failure rate and possibility of necessity for additional dose of methotrexate.   -Consents for methotrexate signed with patient with attending at bedside.    -Methotrexate drug order sent to pharmacy with methotrexate dose calculated based on BSA for patient.   -Patient given strict precautions to call her physician or return to ED if she experiences any severe abdominal pain, dizziness, lightheadedness, severe n/v, or any heavy vaginal bleeding >2 pads/hour for >2 hours.    -Patient instructed on need for follow up of b-hcg on day 4 and day 7 of methotrexate therapy.  Patient intends to follow up at a [facility] lab on  and in the private GYN office 1/10.  -Once patient receives methotrexate therapy she is cleared for discharge from OBGYN perspective.  Primary managment per ED team.     Pt seen and discussed with Dr. John Cordon PGY2

## 2024-01-03 NOTE — CONSULT NOTE ADULT - ATTENDING COMMENTS
Right ectopic pregnancy  Clinically and hemodynamically stable  MTX vs surgery discussed with patient, r/b/a discussed with patient, patient voices understanding and elects for MTX  Consents signed/witnessed  MTX administered; f/u Day 4 and Day 7 labs  Precautions given  Stable for dc home    Chano Lopez MD

## 2024-01-03 NOTE — ED ADULT NURSE NOTE - OBJECTIVE STATEMENT
33 year old female, received to intake. A&Ox4, ambulatory. Respirations equal and unlabored. Denies any past medical history. Pt c/o vaginal spotting and cramping, states she is 7 weeks pregnant. Sent in by MD for repeat HCG. Pt denies any urinary symptoms, vaginal discharge, dizziness, lightheadedness, blurry vision, any other complaints. Labs obtained using butterfly as per JAVED Acosta. Pending US. No acute distress noted. Safety maintained.

## 2024-01-03 NOTE — ED ADULT NURSE NOTE - NSFALLUNIVINTERV_ED_ALL_ED
Bed/Stretcher in lowest position, wheels locked, appropriate side rails in place/Call bell, personal items and telephone in reach/Instruct patient to call for assistance before getting out of bed/chair/stretcher/Non-slip footwear applied when patient is off stretcher/[place] to call system/Physically safe environment - no spills, clutter or unnecessary equipment/Purposeful proactive rounding/Room/bathroom lighting operational, light cord in reach Bed/Stretcher in lowest position, wheels locked, appropriate side rails in place/Call bell, personal items and telephone in reach/Instruct patient to call for assistance before getting out of bed/chair/stretcher/Non-slip footwear applied when patient is off stretcher/[place] to call system/Physically safe environment - no spills, clutter or unnecessary equipment/Purposeful proactive rounding/Room/bathroom lighting operational, light cord in reach

## 2024-01-04 VITALS — HEIGHT: 65 IN | WEIGHT: 179.9 LBS

## 2024-01-04 RX ADMIN — METHOTREXATE 100 MILLIGRAM(S): 2.5 TABLET ORAL at 02:46

## 2024-01-04 NOTE — ED PROVIDER NOTE - OBJECTIVE STATEMENT
33y  at 7w6d by LMP 23 who was sent in from her gynecologist's office for management of suspected ectopic pregnancy. patient reports vaginal spotting for approx 2 weeks w/ hcg having been plateauing with no confirmed IUP. patient denies vaginal bleeding at this present time, no pelvix/abd pain, n/v, lightheadedness, dizziness, syncope.

## 2024-01-04 NOTE — ED PROVIDER NOTE - PATIENT PORTAL LINK FT
You can access the FollowMyHealth Patient Portal offered by [facility] by registering at the following website: [email or web address]. By joining [employer]’s FollowMyHealth portal, you will also be able to view your health information using other applications (apps) compatible with our system. You can access the FollowMyHealth Patient Portal offered by [facility] by registering at the following website: [email or web address]. By joining [employer]’s FollowMyHealth portal, you will also be able to view your health information using other applications (apps) compatible with our system.

## 2024-01-04 NOTE — ED PROVIDER NOTE - CLINICAL SUMMARY MEDICAL DECISION MAKING FREE TEXT BOX
33y  at 7w6d by LMP 23 who was sent in from her gynecologist's office for management of suspected ectopic pregnancy. patient reports vaginal spotting for approx 2 weeks w/ hcg having been plateauing with no confirmed IUP, on presentation patient well appearing, vitals stable. will work patient up for ectopic pregnancy, routine labs, TVUS, gyn consult.

## 2024-01-04 NOTE — ED PROVIDER NOTE - ADDITIONAL NOTES AND INSTRUCTIONS:
Please excuse the absence of Ms. Fairchild. She was evaluated in the emergency room. She may return to work on 1/8/2024. Please excuse the absence of Ms. Fairchild. She was evaluated in the emergency room. She may return to work on 1/5/2024.

## 2024-01-04 NOTE — ED PROVIDER NOTE - NSFOLLOWUPINSTRUCTIONS_ED_ALL_ED_FT
Please follow up with your OB as scheduled.    Ectopic Pregnancy    WHAT YOU NEED TO KNOW:    Ectopic pregnancy occurs when a fertilized egg attaches and begins to grow outside of the uterus. The most common place for this to happen is in the fallopian tube. This is sometimes called a tubal pregnancy. The egg can also implant on the outside of the uterus, on the ovary or cervix, or in the abdomen. The egg may begin to grow, but the pregnancy cannot continue normally. Ectopic pregnancy can cause heavy bleeding and may be life-threatening.    DISCHARGE INSTRUCTIONS:    Call your local emergency number ([phone] in the ) if:    You have chest pain or trouble breathing.    Call your doctor if:    You have sharp pain in your lower abdomen that is severe and starts suddenly.    You feel lightheaded or like you are going to faint.    You have increasing abdominal or pelvic pain or heavy vaginal bleeding.    You have shoulder pain.    You have a fever.    You have questions or concerns about your condition or care.  Medicines: You may need any of the following:    Prescription pain medicine may be given. Ask your healthcare provider how to take this medicine safely. Some prescription pain medicines contain acetaminophen. Do not take other medicines that contain acetaminophen without talking to your healthcare provider. Too much acetaminophen may cause liver damage. Prescription pain medicine may cause constipation. Ask your healthcare provider how to prevent or treat constipation.    Acetaminophen decreases pain and fever. It is available without a doctor's order. Ask how much to take and how often to take it. Follow directions. Read the labels of all other medicines you are using to see if they also contain acetaminophen, or ask your doctor or pharmacist. Acetaminophen can cause liver damage if not taken correctly.    Take your medicine as directed. Contact your healthcare provider if you think your medicine is not helping or if you have side effects. Tell your provider if you are allergic to any medicine. Keep a list of the medicines, vitamins, and herbs you take. Include the amounts, and when and why you take them. Bring the list or the pill bottles to follow-up visits. Carry your medicine list with you in case of an emergency.  If you received methotrexate:    Do not have sex, and limit physical activity. Heavy physical activity or sexual intercourse may cause the ectopic pregnancy to rupture. This can be life-threatening. Your healthcare provider will tell you when it is okay to have sex and return to your normal activities.    Do not get pregnant until your healthcare provider says it is okay. Methotrexate will be harmful to an unborn baby. You will need to wait until at least 1 monthly cycle after you finish the methotrexate course to get pregnant. Your provider may want you to wait until after you have had 3 monthly cycles. This will help make sure all the methotrexate is out of your body.    Avoid folic acid and NSAID medicines. Folic acid, and NSAIDs, such as ibuprofen, prevent methotrexate from working correctly. Do not take folic acid supplements or have foods that are high in folic acid. Examples include orange juice, breakfast cereal, and leafy green vegetables. Your healthcare provider can give you more information on foods to avoid.    You may have some spotting and abdominal pain. This may start a few days after you begin taking methotrexate. These are normal and should only last a short time. Talk to your healthcare provider if you have any concerns.    Limit sunlight exposure. Sunlight can cause a condition caused methotrexate dermatitis (skin irritation).  For support and more information:    The [employer]  [address] [address]  [place] [address]  Phone: [phone]  Phone: 1-470.654.6780  Web Address: http://www.acog.org  Follow up with your gynecologist as directed: You may need to return for a follow-up exam, treatment, or blood tests. If you received methotrexate to stop your pregnancy, it is important to come in for follow-up tests. Write down your questions so you remember to ask them during your visits. Please follow up with your OB as scheduled.    Ectopic Pregnancy    WHAT YOU NEED TO KNOW:    Ectopic pregnancy occurs when a fertilized egg attaches and begins to grow outside of the uterus. The most common place for this to happen is in the fallopian tube. This is sometimes called a tubal pregnancy. The egg can also implant on the outside of the uterus, on the ovary or cervix, or in the abdomen. The egg may begin to grow, but the pregnancy cannot continue normally. Ectopic pregnancy can cause heavy bleeding and may be life-threatening.    DISCHARGE INSTRUCTIONS:    Call your local emergency number ([phone] in the ) if:    You have chest pain or trouble breathing.    Call your doctor if:    You have sharp pain in your lower abdomen that is severe and starts suddenly.    You feel lightheaded or like you are going to faint.    You have increasing abdominal or pelvic pain or heavy vaginal bleeding.    You have shoulder pain.    You have a fever.    You have questions or concerns about your condition or care.  Medicines: You may need any of the following:    Prescription pain medicine may be given. Ask your healthcare provider how to take this medicine safely. Some prescription pain medicines contain acetaminophen. Do not take other medicines that contain acetaminophen without talking to your healthcare provider. Too much acetaminophen may cause liver damage. Prescription pain medicine may cause constipation. Ask your healthcare provider how to prevent or treat constipation.    Acetaminophen decreases pain and fever. It is available without a doctor's order. Ask how much to take and how often to take it. Follow directions. Read the labels of all other medicines you are using to see if they also contain acetaminophen, or ask your doctor or pharmacist. Acetaminophen can cause liver damage if not taken correctly.    Take your medicine as directed. Contact your healthcare provider if you think your medicine is not helping or if you have side effects. Tell your provider if you are allergic to any medicine. Keep a list of the medicines, vitamins, and herbs you take. Include the amounts, and when and why you take them. Bring the list or the pill bottles to follow-up visits. Carry your medicine list with you in case of an emergency.  If you received methotrexate:    Do not have sex, and limit physical activity. Heavy physical activity or sexual intercourse may cause the ectopic pregnancy to rupture. This can be life-threatening. Your healthcare provider will tell you when it is okay to have sex and return to your normal activities.    Do not get pregnant until your healthcare provider says it is okay. Methotrexate will be harmful to an unborn baby. You will need to wait until at least 1 monthly cycle after you finish the methotrexate course to get pregnant. Your provider may want you to wait until after you have had 3 monthly cycles. This will help make sure all the methotrexate is out of your body.    Avoid folic acid and NSAID medicines. Folic acid, and NSAIDs, such as ibuprofen, prevent methotrexate from working correctly. Do not take folic acid supplements or have foods that are high in folic acid. Examples include orange juice, breakfast cereal, and leafy green vegetables. Your healthcare provider can give you more information on foods to avoid.    You may have some spotting and abdominal pain. This may start a few days after you begin taking methotrexate. These are normal and should only last a short time. Talk to your healthcare provider if you have any concerns.    Limit sunlight exposure. Sunlight can cause a condition caused methotrexate dermatitis (skin irritation).  For support and more information:    The [employer]  [address] [address]  [place] [address]  Phone: [phone]  Phone: 1-692.550.7923  Web Address: http://www.acog.org  Follow up with your gynecologist as directed: You may need to return for a follow-up exam, treatment, or blood tests. If you received methotrexate to stop your pregnancy, it is important to come in for follow-up tests. Write down your questions so you remember to ask them during your visits.

## 2024-01-09 ENCOUNTER — NON-APPOINTMENT (OUTPATIENT)
Age: 34
End: 2024-01-09

## 2024-01-09 LAB — HCG SERPL-MCNC: 1156 MIU/ML

## 2024-01-11 LAB — HCG SERPL-MCNC: 880 MIU/ML

## 2024-01-14 NOTE — PLAN
[FreeTextEntry1] : 33 year old female presents today for follow up for pregnancy of unknown location. repeat HCG Advised pt to call the office for results

## 2024-01-14 NOTE — SIGNATURES
[TextEntry] : This note was written by Diana Parra on 12/29/2023 actively solely RICO Hurtado M.D 12/29/2023. All medical record entries made by this scribe were at my  RICO Hurtado M.D  direction and personally dictated by me on 12/29/2023. I have personally reviewed my chart and agree that the record reflects my personal performance of the history, physical exam, assessment, and plan.

## 2024-01-14 NOTE — HISTORY OF PRESENT ILLNESS
[FreeTextEntry1] : 33 year old female presents today for follow up for pregnancy of unknown location. Pt denies vb, abnormal discharge. Hormone pregnancy levels has been plateauing, maximum at 1200, 999 on 12/22 and 12/27, and 975 today. Discussed possibility of ectopic pregnancy. Ectopic precautions given. Advised on options of taking methotrexate shot or repeating HCG levels. Pt is interested in repeating HCG levels in two days. The patient had an opportunity to have all of her questions answered to her apparent satisfaction.

## 2024-01-17 ENCOUNTER — APPOINTMENT (OUTPATIENT)
Dept: OBGYN | Facility: CLINIC | Age: 34
End: 2024-01-17

## 2024-01-18 ENCOUNTER — NON-APPOINTMENT (OUTPATIENT)
Age: 34
End: 2024-01-18

## 2024-01-18 LAB — HCG SERPL-MCNC: 247 MIU/ML

## 2024-01-24 ENCOUNTER — APPOINTMENT (OUTPATIENT)
Dept: OBGYN | Facility: CLINIC | Age: 34
End: 2024-01-24

## 2024-01-25 LAB — HCG SERPL-MCNC: 88 MIU/ML

## 2024-01-31 ENCOUNTER — APPOINTMENT (OUTPATIENT)
Dept: OBGYN | Facility: CLINIC | Age: 34
End: 2024-01-31

## 2024-02-01 DIAGNOSIS — O00.101 RIGHT TUBAL PREGNANCY WITHOUT INTRAUTERINE PREGNANCY: ICD-10-CM

## 2024-02-01 LAB — HCG SERPL-MCNC: 34 MIU/ML

## 2024-02-07 ENCOUNTER — APPOINTMENT (OUTPATIENT)
Dept: OBGYN | Facility: CLINIC | Age: 34
End: 2024-02-07

## 2024-02-08 LAB — HCG SERPL-MCNC: 7 MIU/ML
